# Patient Record
Sex: FEMALE | Race: WHITE
[De-identification: names, ages, dates, MRNs, and addresses within clinical notes are randomized per-mention and may not be internally consistent; named-entity substitution may affect disease eponyms.]

---

## 2018-01-03 ENCOUNTER — HOSPITAL ENCOUNTER (OUTPATIENT)
Dept: HOSPITAL 92 - ERS | Age: 68
Setting detail: OBSERVATION
LOS: 2 days | Discharge: HOME | End: 2018-01-05
Attending: FAMILY MEDICINE | Admitting: FAMILY MEDICINE
Payer: COMMERCIAL

## 2018-01-03 VITALS — BODY MASS INDEX: 38 KG/M2

## 2018-01-03 DIAGNOSIS — Z96.653: ICD-10-CM

## 2018-01-03 DIAGNOSIS — Z80.0: ICD-10-CM

## 2018-01-03 DIAGNOSIS — Z90.710: ICD-10-CM

## 2018-01-03 DIAGNOSIS — G89.29: ICD-10-CM

## 2018-01-03 DIAGNOSIS — F32.9: ICD-10-CM

## 2018-01-03 DIAGNOSIS — Z79.899: ICD-10-CM

## 2018-01-03 DIAGNOSIS — I71.9: ICD-10-CM

## 2018-01-03 DIAGNOSIS — I10: ICD-10-CM

## 2018-01-03 DIAGNOSIS — E11.9: ICD-10-CM

## 2018-01-03 DIAGNOSIS — G45.9: Primary | ICD-10-CM

## 2018-01-03 DIAGNOSIS — Z98.84: ICD-10-CM

## 2018-01-03 DIAGNOSIS — N32.81: ICD-10-CM

## 2018-01-03 DIAGNOSIS — Z82.3: ICD-10-CM

## 2018-01-03 DIAGNOSIS — Z98.890: ICD-10-CM

## 2018-01-03 DIAGNOSIS — Z79.82: ICD-10-CM

## 2018-01-03 LAB
ANION GAP SERPL CALC-SCNC: 16 MMOL/L (ref 10–20)
APTT PPP: 17.1 SEC (ref 22.9–36.1)
BASOPHILS # BLD AUTO: 0 THOU/UL (ref 0–0.2)
BASOPHILS NFR BLD AUTO: 0.1 % (ref 0–1)
BUN SERPL-MCNC: 13 MG/DL (ref 9.8–20.1)
CALCIUM SERPL-MCNC: 9.3 MG/DL (ref 7.8–10.44)
CHLORIDE SERPL-SCNC: 101 MMOL/L (ref 98–107)
CK MB SERPL-MCNC: 0.8 NG/ML (ref 0–6.6)
CO2 SERPL-SCNC: 20 MMOL/L (ref 23–31)
CREAT CL PREDICTED SERPL C-G-VRATE: 97 ML/MIN (ref 70–130)
EOSINOPHIL # BLD AUTO: 0 THOU/UL (ref 0–0.7)
EOSINOPHIL NFR BLD AUTO: 0.3 % (ref 0–10)
GLUCOSE SERPL-MCNC: 206 MG/DL (ref 80–115)
HGB BLD-MCNC: 11.5 G/DL (ref 12–16)
INR PPP: 1
LYMPHOCYTES # BLD: 0.7 THOU/UL (ref 1.2–3.4)
LYMPHOCYTES NFR BLD AUTO: 5.7 % (ref 21–51)
MCH RBC QN AUTO: 33 PG (ref 27–31)
MCV RBC AUTO: 95.8 FL (ref 81–99)
MONOCYTES # BLD AUTO: 0.6 THOU/UL (ref 0.11–0.59)
MONOCYTES NFR BLD AUTO: 5.2 % (ref 0–10)
NEUTROPHILS # BLD AUTO: 10 THOU/UL (ref 1.4–6.5)
NEUTROPHILS NFR BLD AUTO: 88.8 % (ref 42–75)
PLATELET # BLD AUTO: 173 THOU/UL (ref 130–400)
POTASSIUM SERPL-SCNC: 3.2 MMOL/L (ref 3.5–5.1)
PROTHROMBIN TIME: 13.5 SEC (ref 12–14.7)
RBC # BLD AUTO: 3.49 MILL/UL (ref 4.2–5.4)
SODIUM SERPL-SCNC: 134 MMOL/L (ref 136–145)
TROPONIN I SERPL DL<=0.01 NG/ML-MCNC: (no result) NG/ML (ref ?–0.03)
WBC # BLD AUTO: 11.3 THOU/UL (ref 4.8–10.8)

## 2018-01-03 PROCEDURE — 36416 COLLJ CAPILLARY BLOOD SPEC: CPT

## 2018-01-03 PROCEDURE — 71045 X-RAY EXAM CHEST 1 VIEW: CPT

## 2018-01-03 PROCEDURE — 83735 ASSAY OF MAGNESIUM: CPT

## 2018-01-03 PROCEDURE — 70498 CT ANGIOGRAPHY NECK: CPT

## 2018-01-03 PROCEDURE — 96374 THER/PROPH/DIAG INJ IV PUSH: CPT

## 2018-01-03 PROCEDURE — 82962 GLUCOSE BLOOD TEST: CPT

## 2018-01-03 PROCEDURE — 83036 HEMOGLOBIN GLYCOSYLATED A1C: CPT

## 2018-01-03 PROCEDURE — 99285 EMERGENCY DEPT VISIT HI MDM: CPT

## 2018-01-03 PROCEDURE — 83605 ASSAY OF LACTIC ACID: CPT

## 2018-01-03 PROCEDURE — 97139 UNLISTED THERAPEUTIC PX: CPT

## 2018-01-03 PROCEDURE — 74177 CT ABD & PELVIS W/CONTRAST: CPT

## 2018-01-03 PROCEDURE — G0378 HOSPITAL OBSERVATION PER HR: HCPCS

## 2018-01-03 PROCEDURE — 96372 THER/PROPH/DIAG INJ SC/IM: CPT

## 2018-01-03 PROCEDURE — 80061 LIPID PANEL: CPT

## 2018-01-03 PROCEDURE — 70551 MRI BRAIN STEM W/O DYE: CPT

## 2018-01-03 PROCEDURE — 85730 THROMBOPLASTIN TIME PARTIAL: CPT

## 2018-01-03 PROCEDURE — 71046 X-RAY EXAM CHEST 2 VIEWS: CPT

## 2018-01-03 PROCEDURE — 87804 INFLUENZA ASSAY W/OPTIC: CPT

## 2018-01-03 PROCEDURE — 87633 RESP VIRUS 12-25 TARGETS: CPT

## 2018-01-03 PROCEDURE — 0042T: CPT

## 2018-01-03 PROCEDURE — 83690 ASSAY OF LIPASE: CPT

## 2018-01-03 PROCEDURE — 36415 COLL VENOUS BLD VENIPUNCTURE: CPT

## 2018-01-03 PROCEDURE — 80048 BASIC METABOLIC PNL TOTAL CA: CPT

## 2018-01-03 PROCEDURE — 85025 COMPLETE CBC W/AUTO DIFF WBC: CPT

## 2018-01-03 PROCEDURE — 93005 ELECTROCARDIOGRAM TRACING: CPT

## 2018-01-03 PROCEDURE — 96361 HYDRATE IV INFUSION ADD-ON: CPT

## 2018-01-03 PROCEDURE — 82550 ASSAY OF CK (CPK): CPT

## 2018-01-03 PROCEDURE — 70450 CT HEAD/BRAIN W/O DYE: CPT

## 2018-01-03 PROCEDURE — 85610 PROTHROMBIN TIME: CPT

## 2018-01-03 PROCEDURE — 80053 COMPREHEN METABOLIC PANEL: CPT

## 2018-01-03 PROCEDURE — 82553 CREATINE MB FRACTION: CPT

## 2018-01-03 PROCEDURE — 84443 ASSAY THYROID STIM HORMONE: CPT

## 2018-01-03 PROCEDURE — 84484 ASSAY OF TROPONIN QUANT: CPT

## 2018-01-03 PROCEDURE — 70496 CT ANGIOGRAPHY HEAD: CPT

## 2018-01-03 PROCEDURE — 93306 TTE W/DOPPLER COMPLETE: CPT

## 2018-01-03 RX ADMIN — HYDROCODONE BITARTRATE AND ACETAMINOPHEN PRN TAB: 10; 325 TABLET ORAL at 23:10

## 2018-01-03 NOTE — CT
EXAM:

CT ANGIOGRAM OF THE HEAD

CT ANGIOGRAM OF THE NECK

CT PERFUSION

1/3/18

 

HISTORY: 

Left arm drift. Left facial droop.

 

COMPARISON:  

None.

 

TECHNIQUE:  

CT angiogram of the head and neck are performed in the axial plane. Three dimensional reformatted marie
ges are submitted for interpretation. 

 

Perfusion CT is performed in the axial plane. 

 

FINDINGS:  

 

POSTCONTRAST HEAD CT:

There is cortical preservation of gray-white matter differentiation. 

 

Chronic small vessel ischemic change of white matter noted. Both ocular lenses are appropriately loca
giovanny. Both globes are intact. Retrobulbar fat is preserved. Symmetric attenuation of the optic nerve a
nd ocular rectus muscles.

 

Aerodigestive tract is patent. No mucosal abnormality. There is leftward deviation of the tongue. Mid
line fatty raphae of the tongue appears to be preserved. 

 

Parotid and submandibular glands are unremarkable. Symmetric attenuation of the sternocleidomastoid m
uscles. No evidence of lymphadenopathy by size criteria. 

 

There is mass effect upon the posterior left and right glottic and supraglottic larynx secondary to m
edial deviation of both common carotid arteries. 

 

Upper mediastinum and lung apices are unremarkable. 

 

There is extensive degenerative change of the cervical spine with osteophyte formation and loss of di
sc space height. There is moderate central canal stenosis at C6-C7. There are varying degrees of fora
conor stenosis. Evaluation is limited by technique. 

 

CT ANGIOGRAM:

The visualized aortic arch has an overall normal caliber. There is a common origin of the innominate 
artery and left common carotid artery. 

 

RIGHT CAROTID:

The right common carotid artery, carotid bifurcation, and internal carotid artery have overall approp
riate enhancement and luminal diameter. There is a small amount of calcified plaque in the right maldonado
tid bifurcation. 

 

LEFT CAROTID:

The origin of the left carotid artery is unremarkable. There is mild tortuosity proximal left common 
carotid artery. In the left carotid bifurcation, there is a combination of calcified and noncalcified
 plaque. There is a 3 mm ulcerative component. There is mild tortuosity of the proximal left internal
 carotid artery. No evidence of significant stenosis. The origin of both vertebral arteries are gross
ly unremarkable. Vertebral arteries are essentially codominant. Limited evaluation of the proximal ve
rtebral artery due to motion. No obvious high grade filling defect. 

 

CT ANGIOGRAM OF THE HEAD:

The intracranial internal carotid arteries appear to have symmetric enhancement. Mild narrowing of prakash
th petrous segments may be present. There is calcified plaque without significant stenosis in both pa
raclinoid and cavernous segments. 

 

ANTERIOR CIRCULATION:

There is symmetric enhancement and luminal diameter of the A1 and M1 segments. Proximal A2 segments a
re unremarkable. The proximal MCA distribution is also symmetric. 

 

Intracranial vertebral arteries are unremarkable. Both PICA artery origins are unremarkable. Both marisol
tebral arteries supply normal caliber basilar artery. The left and right P1 segments have symmetric e
nhancement and luminal diameter. 

 

CT PERFUSION:

There is no evidence of an increased mean transit time. There is no evidence of significant asymmetri
c decreased blood flow or blood volume. 

 

IMPRESSION:  

1.      Unremarkable CT angiogram of the head.

2.      Small ulcerative lesion in the left carotid bifurcation. No significant stenosis of the cervi
ade carotid or cervical vertebral arteries based upon NASCET criteria. 

3.      Unremarkable CT perfusion. 

4.      Results of the study discussed with Dr. Evangelista, 1/3/18 at 6:25 p.m.

 

Code CR 

 

POS: [facility]

## 2018-01-03 NOTE — CT
EXAM:

NONCONTRAST HEAD CT

1/3/18

 

HISTORY: 

Left sided facial droop. Left arm drift. Stroke activation. 

 

COMPARISON:  

None. 

 

TECHNIQUE:  

Noncontrast head CT is performed from skull base to skull vertex. 

 

FINDINGS:  

No parenchymal hemorrhage. No extra-axial hematoma. No midline shift. Basilar cisterns are patent. Br
ain volume, age appropriate. 

 

Cortical gray-white matter differentiation is preserved. Ventricles and sulci are patent and symmetri
c. 

 

Confluent white matter hypodensities due to chronic small vessel ischemic changes are noted. 

 

The calvarium is intact. Mild mucosal thickening of the sinuses. Adequate mastoid air cell aeration.

 

IMPRESSION:  

No acute intracranial process.

 

Results of the study discussed with Dr. Evangelista, 1/3/18 at 6:10 p.m.

 

Code CR 

 

POS: [facility]

## 2018-01-04 LAB
ALBUMIN SERPL BCG-MCNC: 3.8 G/DL (ref 3.4–4.8)
ALP SERPL-CCNC: 61 U/L (ref 40–150)
ALT SERPL W P-5'-P-CCNC: 13 U/L (ref 8–55)
ANION GAP SERPL CALC-SCNC: 19 MMOL/L (ref 10–20)
ANISOCYTOSIS BLD QL SMEAR: (no result) (100X)
AST SERPL-CCNC: 20 U/L (ref 5–34)
BILIRUB SERPL-MCNC: 0.8 MG/DL (ref 0.2–1.2)
BUN SERPL-MCNC: 21 MG/DL (ref 9.8–20.1)
CALCIUM SERPL-MCNC: 9.6 MG/DL (ref 7.8–10.44)
CHD RISK SERPL-RTO: 2.6 (ref ?–4.5)
CHLORIDE SERPL-SCNC: 100 MMOL/L (ref 98–107)
CHOLEST SERPL-MCNC: 143 MG/DL
CO2 SERPL-SCNC: 20 MMOL/L (ref 23–31)
CREAT CL PREDICTED SERPL C-G-VRATE: 90 ML/MIN (ref 70–130)
GLOBULIN SER CALC-MCNC: 2.9 G/DL (ref 2.4–3.5)
GLUCOSE SERPL-MCNC: 180 MG/DL (ref 80–115)
HDLC SERPL-MCNC: 55 MG/DL
HGB BLD-MCNC: 10.8 G/DL (ref 12–16)
HGB BLD-MCNC: 11.8 G/DL (ref 12–16)
LDLC SERPL CALC-MCNC: 79 MG/DL
LIPASE SERPL-CCNC: 4 U/L (ref 8–78)
MCH RBC QN AUTO: 31.7 PG (ref 27–31)
MCH RBC QN AUTO: 32.1 PG (ref 27–31)
MCV RBC AUTO: 95.2 FL (ref 81–99)
MCV RBC AUTO: 95.5 FL (ref 81–99)
MDIFF COMPLETE?: YES
MDIFF COMPLETE?: YES
PLATELET # BLD AUTO: 202 THOU/UL (ref 130–400)
PLATELET # BLD AUTO: 205 THOU/UL (ref 130–400)
POTASSIUM SERPL-SCNC: 3.4 MMOL/L (ref 3.5–5.1)
RBC # BLD AUTO: 3.39 MILL/UL (ref 4.2–5.4)
RBC # BLD AUTO: 3.68 MILL/UL (ref 4.2–5.4)
SODIUM SERPL-SCNC: 136 MMOL/L (ref 136–145)
TRIGL SERPL-MCNC: 47 MG/DL (ref ?–150)
TROPONIN I SERPL DL<=0.01 NG/ML-MCNC: (no result) NG/ML (ref ?–0.03)
WBC # BLD AUTO: 13.4 THOU/UL (ref 4.8–10.8)
WBC # BLD AUTO: 17.5 THOU/UL (ref 4.8–10.8)

## 2018-01-04 RX ADMIN — TROSPIUM CHLORIDE SCH MG: 20 TABLET, FILM COATED ORAL at 21:31

## 2018-01-04 RX ADMIN — HYDROCODONE BITARTRATE AND ACETAMINOPHEN PRN TAB: 10; 325 TABLET ORAL at 17:16

## 2018-01-04 RX ADMIN — HYDROCODONE BITARTRATE AND ACETAMINOPHEN PRN TAB: 10; 325 TABLET ORAL at 21:35

## 2018-01-04 RX ADMIN — TROSPIUM CHLORIDE SCH MG: 20 TABLET, FILM COATED ORAL at 08:20

## 2018-01-04 RX ADMIN — HYDROCODONE BITARTRATE AND ACETAMINOPHEN PRN TAB: 10; 325 TABLET ORAL at 05:36

## 2018-01-04 RX ADMIN — AMOXICILLIN AND CLAVULANATE POTASSIUM SCH MG: 875; 125 TABLET, FILM COATED ORAL at 21:31

## 2018-01-04 NOTE — RAD
CHEST 1 VIEW:

 

HISTORY: 

Wheezing, fever.

 

COMPARISON: 

None.

 

FINDINGS: 

Heart size is upper limits of normal.  Mild blunting of the left lateral costophrenic sulcus.

 

Faint airspace opacity right lower lobe.

 

IMPRESSION: 

1.  Faint opacity of right lower lobe which could be infection or atelectasis.

2.  Cardiac size upper limits of normal.

3.  Mild blunting of the left lateral costophrenic sulcus.  The posterior costophrenic sulcus is norm
al and may represent increase extrapleural fat.

4.  Mild fullness of the right paratracheal region may represent ectatic vasculature.  This is confir
med on the CT examination of the prior day.

 

POS: [facility]

## 2018-01-04 NOTE — RAD
CHEST ONE VIEW

1/4/18

 

HISTORY: 

New onset shortness of breath. 

 

COMPARISON:  

1/4/18

 

FINDINGS:  

Portable upright chest demonstrates an enlarged cardiac silhouette. There is a left perihilar opacity
. Costophrenic angles are clear. No pneumothorax or osseous abnormalities.

 

IMPRESSION:  

1.      Cardiomegaly.

2.      Left perihilar opacity. Possibility of a focal air space disease is raised. Continued surveil
sheila to ensure resolution is recommended. 

 

POS: [facility]

## 2018-01-04 NOTE — ADD-PRG
DATE:  01/04/2018

 

ADDENDUM:  To the note of Dr. Ivan Blanc.

 

Ms. Barahona is a 67-year-old white female who was admitted with left facial droop and arm weakness.  He
r symptoms seem consistent with a TIA.  Currently, she does not demonstrate facial droop and she move
s all extremities without difficulty.

 

She is awake and alert.  She is in no distress.  Her workup thus far includes a head and neck CTA, wh
ich was unremarkable except for a small ulcerative lesion in the left carotid bifurcation.  There was
 no significant stenosis.

 

Her MRI shows no evidence of a stacy infarct.

 

LABORATORY DATA:  White count is 11,300, hemoglobin is 11.5, hematocrit is 33.4 with an MCV of 95.8. 
 Chemistries:  Sodium 134, potassium 3.2, chloride 101, bicarbonate 20, BUN 13 and creatinine 0.95.  
Her glucose is 206.

 

ASSESSMENT:  Transient ischemic attack.

 

PLAN:  Begin aspirin and statins.  Monitor blood pressure.  Treat diabetes.

## 2018-01-04 NOTE — MRI
BRAIN MRI WITHOUT CONTRAST:

 

Date: 1-4-18 

 

Comparison: None. 

 

History: Left sided facial droop, left arm drift. 

 

Technique: Multiplanar, multisequence MR imaging of the brain is obtained without contrast. 

 

FINDINGS: 

The diffusion weighted imaging demonstrates no evidence for acute infarction. 

 

The axial gradient echo imaging demonstrates no evidence for intracranial hemorrhage. 

 

There is extensive periventricular, deep, and subcortical white matter T2 and FLAIR hyperintensity, e
vidence of significant small vessel disease. 

 

Partial opacification of the mastoid air cells noted bilaterally. There is  mild mucosal thickening i
nvolving bilateral frontal sinuses, ethmoid air cells, maxillary sinuses and sphenoid sinus. 

 

There is no midline shift or mass effect. No ventricular enlargement. 

 

Arterial flow voids at axial level of skull base appear grossly unremarkable on the T2 weighted imagi
ng. 

 

IMPRESSION: 

Prominent small vessel disease. No evidence for intracranial hemorrhage or acute infarction. 

 

POS: [facility]

## 2018-01-04 NOTE — HP-2
CODE STATUS:  FULL.

 

PRIMARY CARE PHYSICIAN:  Mayte foster, Dr. Mayi Verdugo.

 

ATTENDING:  Dr. Ming Mojica.

 

RESIDENT:  Dr. Tania Barclay.

 

HISTORIAN:  The patient.

 

CHIEF COMPLAINT:  Reported left facial droop and arm weakness.

 

HISTORY OF PRESENT ILLNESS:  Patient is a 67-year-old female with past medical 
history of type 2 diabetes mellitus, hypertension who presents with left facial 
droop and left arm weakness noticed by her son.  The patient denies noticing 
these, but just reports that she felt weak and dizzy and felt like she was 
going to fall over.  Patient has no prior history of stroke or TIAs.  No 
cardiac history.  The patient does report recent cold symptoms starting 10 days 
ago with cough and congestion.  Denies fever at home.  The patient denies chest 
pain, shortness of breath.  This episode happened around 4:30 in the afternoon.
  ER doctor reports a left facial droop and arm weakness on admission, but by 
the time patient was rolling back for CT scan, symptoms had resolved. 

 

In regard to chronic medical conditions, the patient does report diagnosed with 
type 2 diabetes, but has never been prescribed medication.  The patient also 
reports an aortic aneurysm that she has not followed up with a cardiologist for 
in the last 1-1/2 years.

 

PAST MEDICAL HISTORY:

1.  Depression.

2.  Type 2 diabetes mellitus, untreated.

3.  Hypertension.

4.  History of aortic aneurysm.

5.  Chronic back pain.

 

PAST SURGICAL HISTORY:

1.  Gastric bypass.

2.  Hernia repair.

3.  Hysterectomy.

4.  Bilateral knee replacements.

 

ALLERGIES:  No known drug allergies.

 

MEDICATIONS:

1.  Losartan/hydrochlorothiazide combination of 50/12.5 mg daily.

2.  Citalopram 20 mg daily.

3.  Loratadine 10 mg daily.

4.  Daily multivitamins.

5.  B12.

6.  Vitamin E.

7.  VESIcare 10 mg daily.

8.  Norco 10/325 mg t.i.d.

9.  Aspirin 81 mg daily.

 

FAMILY HISTORY:  Mother  from a stroke in her 70s.  Sister  from a 
stroke in her 50s.  Dad had colon cancer and her brother  of an MI at an 
early age.

 

SOCIAL HISTORY:  Denies tobacco, alcohol, or drug use.  The patient lives with 
 and kids along, is able to accomplish ADLs on her own normally.

 

REVIEW OF SYSTEMS:  A 12-point review of systems was performed and findings 
discussed above in the HPI, all other systems were negative.

 

PHYSICAL EXAMINATION:

VITAL SIGNS:  Blood pressure 169/79, pulse 123, respiratory rate 21, T-max 100.3
, pulse ox 99% on room air.  Current weight 108.32.

GENERAL:  The patient is alert and oriented x4, in no acute distress.  She is 
obese, appropriately interactive.

EYES:  EOMI.  Cranial nerves II intact.

ENT:  Oropharynx within normal limits.

NECK:  Supple without lymphadenopathy.

CARDIOVASCULAR:  Regular rate and rhythm.  No murmurs or gallops.

RESPIRATORY:  Decreased breath sounds diffusely.  Mild rales.

SKIN:  Warm and dry.

ABDOMEN:  Soft, nontender, bowel sounds present.

EXTREMITIES:  No clubbing, cyanosis, or edema.

MUSCULOSKELETAL:  Structure within normal limits.  Tone within normal limits.  
Muscle strength 5/5 in upper and lower extremities.  Full range of motion.

NEUROLOGIC:  No focal deficits.  Cranial nerves II through XII intact.

PSYCHIATRIC:  Appropriate.

 

LABORATORY DATA:  CBC, white blood cell count 11.3, hemoglobin 11.5, hematocrit 
33.4, platelets 173,000, MCV 95.0, 88% neutrophils.

 

Other Studies:  Glucose 190.  PTT 17.1, PT 13.5.  INR 1.0.  CK-MB 0.8.  CK 71.  
Troponin less than 0.010.

 

IMAGING:  CT brain was done with no acute intracranial process.  A CTA of head 
and neck was performed that showed small ulcerative lesion of the left carotid 
bifurcation.

 

ASSESSMENT AND PLAN:

1.  Transient ischemic attack.  Admit to the stroke, symptoms resolved.  Family 
not present when interviewed.  She denies stroke symptoms, so history is as per 
EMS.  We will check A1c, CMP, mag, fasting lipid panel, and TSH.  We will get 
an MRI in the morning as she is high risk for cerebrovascular accident given 
her family history, her untreated diabetes and her hypertension. Consider neuro 
consult for CTA findings of ulcerative lesion. 

2.  Type 2 diabetes, reportedly not on medications. We will check an A1c.  We 
will do Accu-Cheks a.c. and at bedtime.

3.  Hypertension.  We will continue home hydrochlorothiazide and losartan.

4.  Depression.  We will continue home escitalopram.

5.  Overactive bladder.  We will continue home VESIcare.

6.  Upper respiratory symptoms.  Duration of illness and lung exam concern for 
pulmonary pathology, we will get a chest x-ray to evaluate and check a flu swab 
and has symptomatic medications such as Tylenol for fever.

7.  Deep venous thrombosis prophylaxis.  Lovenox.

 

DISPOSITION AND LENGTH OF HOSPITAL STAY:  One to two days.

 

Symptomatic medications will be provided.

 

History and physical exam as well as management were discussed with Dr. Mojica.

 

POLI

## 2018-01-04 NOTE — CT
EXAM:

ABDOMEN CT WITH CONTRAST

PELVIC CT WITH CONTRAST

1/4/18

 

HISTORY: 

Left lower quadrant pain, new onset. 

 

COMPARISON:  

None.

 

TECHNIQUE:  

Abdomen and pelvic CT performed with IV and enteric contrast. Coronal reformatted images are submitte
d for interpretation.

 

FINDINGS:  

 

ABDOMEN CT:

Consolidation of the left lower lobe due to atelectasis or pneumonia. Heart size is upper normal. Tra
ce amount of pericardial fluid. The visualized aorta demonstrates atherosclerosis. No dissection or a
neurysm.

 

Symmetric attenuation of the psoas muscles. Intra and extrahepatic portal vein is patent. Liver, sple
en, and right adrenal gland are unremarkable. There is atrophy of the pancreas. There is indeterminat
e nodule involving the left adrenal gland measuring 1.5 x 1.6 cm with an attenuation coefficient of 3
7 Hounsfield units. 

 

No gastrohepatic, retrocrural or periportal lymphadenopathy. 

 

No mesenteric mass, lymphadenopathy, free air or free fluid. There is an anterior lower left abdomina
l wall hernia containing mesenteric fat. There is a slightly more higher left abdominal wall hernia c
ontaining a small focus of air measuring 1.2 cm. There is colon adjacent to this defect. The small fo
cus of air may represent a herniated diverticulum. No definite acute inflammatory change. 

 

No evidence of colonic obstruction. There are appears to be previous appendectomy change. Ileocecal j
unction is normal. Multiple normal caliber small bowel loops. There is evidence of previous surgery i
nvolving the proximal stomach likely due to bariatric changes. Duodenum and small bowel loops are unr
emarkable. 

 

PELVIC CT:

There are diverticula in the sigmoid colon. No evidence of diverticulitis. Surgically absent uterus. 
Urinary bladder is unremarkable. 

 

No pelvic mass, lymphadenopathy, free air or free fluid. 

 

No lytic or blastic lesions in the osseous structures. Multilevel vacuum disc phenomenon.

 

IMPRESSION:  

1.      Consolidation of left lower lobe due to atelectasis or pneumonia.

2.      Ventral abdominal wall hernia containing mesenteric fat. 

3.      Small herniation in the anterior left abdomen with a small focus of air collection. There is 
adjacent colon. The small air collection is possibly a small diverticulum that has herniated through 
the defect. There is no associated inflammation at this time. 

4.      Sigmoid colon diverticulosis without evidence of diverticulitis. 

 

1.      

 

POS: [facility]

## 2018-01-04 NOTE — PDOC.FM
- Subjective


Subjective: 





Patient states she feels well. Her dizziness and previous SOB had resolved. She 

states she has no weakness in face or limbs.





- Objective


MAR Reviewed: Yes


Vital Signs & Weight: 


 Vital Signs (12 hours)











  Temp Pulse Resp BP BP Pulse Ox


 


 01/04/18 08:00  99.3 F  82  16   


 


 01/04/18 07:22  99.3 F  82  16   129/64  92 L


 


 01/04/18 06:28   96  18  135/70  


 


 01/04/18 05:36  102.7 F H  93  18  184/84 H  








 Weight











Weight                         105.233 kg














Result Diagrams: 


 01/04/18 05:17





 01/03/18 23:18





Phys Exam





- Physical Examination


Constitutional: NAD


HEENT: moist MMs


Neck: no nodes, supple


Respiratory: no rales


Wheezing and crackles present


Cardiovascular: RRR, no significant murmur


Gastrointestinal: soft, non-tender, no distention


Musculoskeletal: no edema


Neurological: moves all 4 limbs


4/5 left side strength, mild left side facial droop, not involving eyebrow


Lymphatic: no nodes


Psychiatric: A&O x 3


Skin: no rash





Dx/Plan


(1) TIA (transient ischemic attack)


Status: Acute   


Plan: 


Will await MRI results and cardiac echo. Consult neurology. At this time, 

medical management, including statin, aspirin and increase control of DM2.








(2) HTN (hypertension)


Code(s): I10 - ESSENTIAL (PRIMARY) HYPERTENSION   Status: Acute   


Plan: 


Continue with home medication, bp within good control at this time








(3) Diabetes type 2, uncontrolled


Code(s): E11.65 - TYPE 2 DIABETES MELLITUS WITH HYPERGLYCEMIA   Status: Acute   


Plan: 


Will like to try Metformin 1000 mg bid as patient can tolerate. A1c is elevated 








(4) Depression


Code(s): F32.9 - MAJOR DEPRESSIVE DISORDER, SINGLE EPISODE, UNSPECIFIED   Status

: Acute   


Plan: 


Continue home medication.








(5) Pneumonia, community acquired


Code(s): J18.9 - PNEUMONIA, UNSPECIFIED ORGANISM   Status: Acute   


Plan: 


Likely community acquired pneumonia versus URI/sinusitis. With patient's fever, 

elevated wbc, infectious cause is likely. Will start on amoxicillin.

## 2018-01-05 VITALS — DIASTOLIC BLOOD PRESSURE: 56 MMHG | TEMPERATURE: 98.7 F | SYSTOLIC BLOOD PRESSURE: 107 MMHG

## 2018-01-05 LAB
ANION GAP SERPL CALC-SCNC: 11 MMOL/L (ref 10–20)
BUN SERPL-MCNC: 24 MG/DL (ref 9.8–20.1)
CALCIUM SERPL-MCNC: 9 MG/DL (ref 7.8–10.44)
CHLORIDE SERPL-SCNC: 100 MMOL/L (ref 98–107)
CO2 SERPL-SCNC: 25 MMOL/L (ref 23–31)
CREAT CL PREDICTED SERPL C-G-VRATE: 96 ML/MIN (ref 70–130)
GLUCOSE SERPL-MCNC: 154 MG/DL (ref 80–115)
HGB BLD-MCNC: 9.5 G/DL (ref 12–16)
MCH RBC QN AUTO: 31.9 PG (ref 27–31)
MCV RBC AUTO: 97 FL (ref 81–99)
MDIFF COMPLETE?: YES
PLATELET # BLD AUTO: 225 THOU/UL (ref 130–400)
POTASSIUM SERPL-SCNC: 3.1 MMOL/L (ref 3.5–5.1)
RBC # BLD AUTO: 2.99 MILL/UL (ref 4.2–5.4)
SODIUM SERPL-SCNC: 133 MMOL/L (ref 136–145)
WBC # BLD AUTO: 16.6 THOU/UL (ref 4.8–10.8)

## 2018-01-05 RX ADMIN — HYDROCODONE BITARTRATE AND ACETAMINOPHEN PRN TAB: 10; 325 TABLET ORAL at 02:57

## 2018-01-05 RX ADMIN — AMOXICILLIN AND CLAVULANATE POTASSIUM SCH MG: 875; 125 TABLET, FILM COATED ORAL at 08:34

## 2018-01-05 RX ADMIN — TROSPIUM CHLORIDE SCH MG: 20 TABLET, FILM COATED ORAL at 08:35

## 2018-01-05 NOTE — PRG
DATE OF SERVICE:  01/05/2018

 

SUBJECTIVE:  Ms. Barahona had a slightly elevated fever yesterday and subsequently found to have a possi
ble pneumonia on an incidental finding on CT scan.  She had also had some left lower quadrant and lef
t middle quadrant abdominal pain.  This has since resolved.  Interestingly, her CT of the abdomen bud
wed small herniation in the anterior left abdomen with small focus of air collection.  There is possi
alexandrea a small diverticulum that is herniated into the defect, but this seems to have reduced spontaneou
sly.  We have asked her to follow up with the surgeon, particularly should she have any further disco
mfort.  In the meantime, we will go ahead and treat her pneumonia and possible diverticulitis with p.
o. Levaquin.  She also had a positive flu swab and is on Tamiflu.  Clinically, she is much improved. 
 She is currently afebrile.  She is in no distress.  She is having no cough, no shortness of breath, 
no abdominal pain.  She will be discharged on oral Levaquin with instructions for close followup with
 her PCP and possibly with the general surgeon.

## 2018-01-05 NOTE — PDOC.FM
- Subjective


Subjective: 





Patient feels better, acute abd pain has passed. Yesterday she had event with 

severe left lower quadrant abdominal pain lasting for over an hour, with 

patient in fetal position. Emergent CT scan was done, however it did not 

identify any cause for pain except a small left sided hernia with no 

inflammation. Lab was concerning with elevated WBC and lactic acid. As result, 

patient was kept over night. Lactic acid has trended down now.





- Objective


MAR Reviewed: Yes


Vital Signs & Weight: 


 Vital Signs (12 hours)











  Temp Pulse Resp BP BP Pulse Ox


 


 01/05/18 08:00  98.6 F  77  18   


 


 01/05/18 07:45  98.6 F  77  18   130/59 L  92 L


 


 01/05/18 05:21  98.9 F  77  18  120/57 L   93 L


 


 01/05/18 00:22  100 F H  86  18  120/58 L   92 L


 


 01/04/18 21:35  99.5 F     








 Weight











Weight                         105.233 kg














I&O: 


 











 01/04/18 01/05/18 01/06/18





 06:59 06:59 06:59


 


Intake Total  1480 120


 


Balance  1480 120











Result Diagrams: 


 01/05/18 05:44





 01/05/18 05:44





Phys Exam





- Physical Examination


Constitutional: NAD


HEENT: moist MMs


Neck: no nodes


Respiratory: no wheezing


LLL crackles


Cardiovascular: RRR, no significant murmur


Gastrointestinal: soft


Musculoskeletal: no edema


Neurological: moves all 4 limbs


Lymphatic: no nodes


Psychiatric: A&O x 3


Skin: no rash





Dx/Plan


(1) TIA (transient ischemic attack)


Status: Acute   


Plan: 


MRI done, finding only chronic small vessels changes. 


Cardiac echo pending, will let patient know when done.


At this time, medical management, including statin, aspirin and increase 

control of DM2.








(2) HTN (hypertension)


Code(s): I10 - ESSENTIAL (PRIMARY) HYPERTENSION   Status: Acute   


Plan: 


Continue with home medication, bp within good control at this time








(3) Diabetes type 2, uncontrolled


Code(s): E11.65 - TYPE 2 DIABETES MELLITUS WITH HYPERGLYCEMIA   Status: Acute   


Plan: 


Will like to try Metformin 1000 mg bid as patient can tolerate. A1c is elevated 








(4) Depression


Code(s): F32.9 - MAJOR DEPRESSIVE DISORDER, SINGLE EPISODE, UNSPECIFIED   Status

: Acute   


Plan: 


Continue home medication.








(5) Pneumonia, community acquired


Code(s): J18.9 - PNEUMONIA, UNSPECIFIED ORGANISM   Status: Acute   


Plan: 


Likely community acquired pneumonia versus URI/sinusitis. With patient's fever, 

elevated wbc, infectious cause is likely. Will start on amoxicillin.








(6) Hypokalemia


Code(s): E87.6 - HYPOKALEMIA   Status: Acute   


Plan: 


Will treat with 40 meq of K as one time dose








(7) Influenza


Code(s): J11.1 - FLU DUE TO UNIDENTIFIED INFLUENZA VIRUS W OTH RESP MANIFEST   

Status: Acute   


Plan: 


Patient has influenza A. Start on tamiflu








(8) Acute abdominal pain in left lower quadrant


Code(s): R10.32 - LEFT LOWER QUADRANT PAIN   Status: Acute   


Plan: 


See HPI for work up. At this time, may be possible pain from pneumonia in LLL, 

may be spontaneously reduced 1cm herniation in left abdomen or transient 

ischemia. Problem has completely resolved, so we will continue to monitor while 

patient is here.

## 2018-01-08 NOTE — DIS-2
DATE OF ADMISSION:  01/03/2018

 

DATE OF DISCHARGE:  01/05/2018

 

ADMITTING ATTENDING:  Ming Mojica M.D.

 

DISCHARGE ATTENDING:  Cooper Soriano MD

 

RESIDENT:  Ivan Blanc M.D.

 

PROCEDURES:

1.  Brain CT on 01/03/2018.  Impression:  No acute intracranial processes.

2.  Neck and head CTA with brain perfusion on 01/03/2018.  Impression:  There is cortical preservatio
n of gray white matter differentiation chronic small vessel ischemic changes of white matter.  Upper 
mediastinum and lung apices are unremarkable.  There are extensive degenerative changes of the cervic
al spine with osteophyte formation and loss of fixed space height.  There is moderate central canal s
tenosis at C6 and C7.

3.  Abdominal pelvis CT.  Impression:  Consolidation of left lower lobe due to atelectasis or pneumon
ia.  Ventral abdominal hernia containing mesenteric fat, small herniation in the anterior left abdome
n with small focus of air collection, possibly representing a small diverticulum that has herniated, 
sigmoid colon diverticulosis without evidence of diverticulitis.

4.  Chest x-ray on 01/04/2018.  Impression:  Cardiomegaly and left perihilar opacity, possible focal 
airspace disease.

 

DISCHARGE DIAGNOSES:

1.  Transient ischemic attack.

2.  Hypertension.

3.  Diabetes type 2.

4.  Depression.

5.  Pneumonia, community acquired.

6.  Hypokalemia.

7.  Influenza.

8.  Acute abdominal pain and left lower quadrant.

 

DISCHARGE MEDICATIONS:

1.  Aspirin 81 mg oral daily.

2.  Atorvastatin 40 mg oral at bedtime.

3.  Colace 100 mg oral twice daily as needed.

4.  Lexapro 20 mg oral daily.

5.  Levaquin 750 mg oral daily.

6.  Hyzaar 1 tablet oral daily.

7.  Metformin 500 mg oral twice daily with meal.

8.  Tamiflu 75 mg oral twice daily.

9.  Vitamin E 400 units oral daily.

10.  Cyanocobalamin 1000 mcg oral daily.

11.  Multivitamin 1 tablet oral daily.

12.  Ranitidine 150 mg oral daily.

13.  Loratadine 10 mg oral daily.

14.  VESIcare 10 mg oral daily.

15.  Losartan/ hydrochlorothiazide 50/12.5 one tablet oral daily.

 

HISTORY OF PRESENT ILLNESS AND HOSPITAL COURSE:  The patient is a 67-year-old female with past medica
l history of type 2 diabetes mellitus, hypertension, who noticed cellulitis, facial droop, and left a
rm weakness.  She denies feeling any of that, but she did endorse feeling weak and dizzy and like she
 was going to fall over.  Patient had no prior history of stroke or TIA or cardiac history.  She did 
say that she had cold symptoms started about 10 days ago, cough and congestion, and that this episode
 of weakness happened prior to coming in.  In the ER, she was noted to have a left facial droop and w
eakness by the ER physician, by the time the patient was being scanned in the ER, the symptom had res
olved.  Scans done or as noted above, not finding any kind of acute changes.  She was admitted now to
 be fully worked up for a TIA.  She had a questionable history of type 2 diabetes per patient's recol
lection and this was also investigated while she was hospitalized.  The next day, as a TIA workup was
 normal, she was medically managed, including starting statin, aspirin, and increase control of her d
iabetes type 2 as her A1c did come back at 7.5 indicating that she does indeed have diabetes.  For he
r hypertension, she was continued on her home medication with her blood pressure within good control.
  Her metformin was increased while during her stay here, she was noted on x-ray to have possible com
munity-acquired pneumonia and also was flu positive and was started on amoxicillin and Tamiflu.  As f
or her chronic condition of depression, she was continued on her home medications.  She did have a pe
riod of hypokalemia on the day after admission, which she was treated with oral potassium and asked f
or acute abdominal pain that she had in the left lower quadrant while she was here.  CT scan did only
 reveal diverticulosis without diverticulitis, though she did have a hernia; however, the hernia was 
reducible.  She was advised that she should follow up with a general surgeon if this continues to be 
a problem, but it does not appear that the hernia was strangulated.

 

DISPOSITION:  Stable.

 

DISCHARGE INSTRUCTIONS:

1.  Location:  To home.

2.  Diet:  Heart healthy.

3.  Activity:  As tolerated.

4.  Followup:  Followup with personal primary care physician.

## 2018-02-03 NOTE — EKG
Test Reason : 

Blood Pressure : ***/*** mmHG

Vent. Rate : 099 BPM     Atrial Rate : 087 BPM

   P-R Int : 000 ms          QRS Dur : 094 ms

    QT Int : 356 ms       P-R-T Axes : 000 047 075 degrees

   QTc Int : 456 ms

 

Atrial fibrillation

Nonspecific T wave abnormality , probably digitalis effect

Abnormal ECG

 

Confirmed by MAAME GARCIA, LEO ([id number]),  BRIDGETTE NUNES ([id number]) on 2/3/2018 2:07:44 PM

 

Referred By:             Confirmed By:LEO ISABEL MD

## 2019-03-09 ENCOUNTER — HOSPITAL ENCOUNTER (EMERGENCY)
Dept: HOSPITAL 92 - ERS | Age: 69
Discharge: HOME | End: 2019-03-09
Payer: MEDICARE

## 2019-03-09 DIAGNOSIS — R06.02: Primary | ICD-10-CM

## 2019-03-09 DIAGNOSIS — E66.9: ICD-10-CM

## 2019-03-09 DIAGNOSIS — Z79.84: ICD-10-CM

## 2019-03-09 DIAGNOSIS — I10: ICD-10-CM

## 2019-03-09 DIAGNOSIS — Z86.73: ICD-10-CM

## 2019-03-09 DIAGNOSIS — Z79.899: ICD-10-CM

## 2019-03-09 DIAGNOSIS — E11.9: ICD-10-CM

## 2019-03-09 DIAGNOSIS — F32.9: ICD-10-CM

## 2019-03-09 LAB
ALBUMIN SERPL BCG-MCNC: 4 G/DL (ref 3.4–4.8)
ALP SERPL-CCNC: 55 U/L (ref 40–150)
ALT SERPL W P-5'-P-CCNC: 16 U/L (ref 8–55)
ANION GAP SERPL CALC-SCNC: 13 MMOL/L (ref 10–20)
AST SERPL-CCNC: 15 U/L (ref 5–34)
BASOPHILS # BLD AUTO: 0.1 THOU/UL (ref 0–0.2)
BASOPHILS NFR BLD AUTO: 0.8 % (ref 0–1)
BILIRUB SERPL-MCNC: 0.4 MG/DL (ref 0.2–1.2)
BUN SERPL-MCNC: 15 MG/DL (ref 9.8–20.1)
CALCIUM SERPL-MCNC: 10.4 MG/DL (ref 7.8–10.44)
CHLORIDE SERPL-SCNC: 104 MMOL/L (ref 98–107)
CK SERPL-CCNC: 35 U/L (ref 29–168)
CO2 SERPL-SCNC: 25 MMOL/L (ref 23–31)
CREAT CL PREDICTED SERPL C-G-VRATE: 0 ML/MIN (ref 70–130)
EOSINOPHIL # BLD AUTO: 0.2 THOU/UL (ref 0–0.7)
EOSINOPHIL NFR BLD AUTO: 2.9 % (ref 0–10)
GLOBULIN SER CALC-MCNC: 2.5 G/DL (ref 2.4–3.5)
GLUCOSE SERPL-MCNC: 165 MG/DL (ref 80–115)
HGB BLD-MCNC: 11 G/DL (ref 12–16)
LIPASE SERPL-CCNC: 33 U/L (ref 8–78)
LYMPHOCYTES # BLD: 1.8 THOU/UL (ref 1.2–3.4)
LYMPHOCYTES NFR BLD AUTO: 26.8 % (ref 21–51)
MCH RBC QN AUTO: 30.5 PG (ref 27–31)
MCV RBC AUTO: 95.6 FL (ref 78–98)
MONOCYTES # BLD AUTO: 0.5 THOU/UL (ref 0.11–0.59)
MONOCYTES NFR BLD AUTO: 7.6 % (ref 0–10)
NEUTROPHILS # BLD AUTO: 4.2 THOU/UL (ref 1.4–6.5)
NEUTROPHILS NFR BLD AUTO: 61.9 % (ref 42–75)
PLATELET # BLD AUTO: 244 THOU/UL (ref 130–400)
POTASSIUM SERPL-SCNC: 3.9 MMOL/L (ref 3.5–5.1)
RBC # BLD AUTO: 3.61 MILL/UL (ref 4.2–5.4)
SODIUM SERPL-SCNC: 138 MMOL/L (ref 136–145)
WBC # BLD AUTO: 6.7 THOU/UL (ref 4.8–10.8)

## 2019-03-09 PROCEDURE — 83880 ASSAY OF NATRIURETIC PEPTIDE: CPT

## 2019-03-09 PROCEDURE — 87804 INFLUENZA ASSAY W/OPTIC: CPT

## 2019-03-09 PROCEDURE — 71045 X-RAY EXAM CHEST 1 VIEW: CPT

## 2019-03-09 PROCEDURE — 85379 FIBRIN DEGRADATION QUANT: CPT

## 2019-03-09 PROCEDURE — 80053 COMPREHEN METABOLIC PANEL: CPT

## 2019-03-09 PROCEDURE — 83690 ASSAY OF LIPASE: CPT

## 2019-03-09 PROCEDURE — 85025 COMPLETE CBC W/AUTO DIFF WBC: CPT

## 2019-03-09 PROCEDURE — 93005 ELECTROCARDIOGRAM TRACING: CPT

## 2019-03-09 PROCEDURE — 82550 ASSAY OF CK (CPK): CPT

## 2019-03-09 PROCEDURE — 84484 ASSAY OF TROPONIN QUANT: CPT

## 2019-03-09 NOTE — RAD
CHEST ONE VIEW

3/9/19

 

INDICATION:

Shortness of breath and dyspnea. 

 

COMPARISON:  

Prior exam dated 1/4/18.

 

IMPRESSION:  

There is stable cardiomegaly. COPD change is stable. Costophrenic angles are excluded. No definite pn
eumothorax or acute osseous abnormality is noted. 

 

POS: [facility]

## 2020-01-30 ENCOUNTER — HOSPITAL ENCOUNTER (EMERGENCY)
Dept: HOSPITAL 92 - ERS | Age: 70
Discharge: HOME | End: 2020-01-30
Payer: MEDICARE

## 2020-01-30 DIAGNOSIS — S42.211A: Primary | ICD-10-CM

## 2020-01-30 DIAGNOSIS — Z79.899: ICD-10-CM

## 2020-01-30 DIAGNOSIS — Z79.84: ICD-10-CM

## 2020-01-30 DIAGNOSIS — F32.9: ICD-10-CM

## 2020-01-30 DIAGNOSIS — E11.9: ICD-10-CM

## 2020-01-30 DIAGNOSIS — Z86.73: ICD-10-CM
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
## 2020-01-30 DIAGNOSIS — Z79.82: ICD-10-CM

## 2020-01-30 DIAGNOSIS — E66.9: ICD-10-CM
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
## 2020-01-30 DIAGNOSIS — I10: ICD-10-CM

## 2020-01-30 PROCEDURE — 72125 CT NECK SPINE W/O DYE: CPT

## 2020-01-30 PROCEDURE — 72170 X-RAY EXAM OF PELVIS: CPT

## 2020-01-30 NOTE — RAD
RIGHT HIP TWO VIEWS:

1/30/20

 

HISTORY: 

Fall yesterday.

 

Some minimal arthritic changes of the hip. There is no signs of fracture or dislocation. 

 

IMPRESSION: 

No evidence of fracture. 

 

POS: [facility]

## 2020-01-30 NOTE — RAD
AP PELVIS:

1/30/20

 

HISTORY: 

Fall with pelvic pain. 

 

Pelvic ring is intact without evidence of fracture. Arthritic changes of the lower lumbar spine are n
oted. SI joints are symmetric. 

 

IMPRESSION: 

No evidence of pelvic fracture. 

 

POS: [facility]

## 2020-01-30 NOTE — RAD
RIGHT SHOULDER TWO VIEWS:

1/30/20

 

HISTORY: 

Fall yesterday. 

 

There is a right humeral head and neck fracture. The bones are demineralized. There are arthritic sander
nges of the AC joint. 

 

IMPRESSION: 

Right humeral head and neck fracture. Humeral head is slightly subluxed in position on the AP view bu
t appears to have a more normal appearance on the transscapular Y-view. 

 

POS: [facility]

## 2020-01-30 NOTE — RAD
PA CHEST AND RIGHT RIBS FOUR VIEWS:

1/30/20

 

HISTORY: 

Fall yesterday. 

 

Heart size is upper limits of normal. Bones are demineralized. No pneumothorax identified. No rib fra
ctures are seen. A right humeral head and neck fracture is noted. The humeral head is subluxed inferi
tierra. 

 

IMPRESSION: 

Right humeral neck and head fracture. 

 

POS: [facility]